# Patient Record
(demographics unavailable — no encounter records)

---

## 2024-10-29 NOTE — ASSESSMENT
[FreeTextEntry1] : Lower lumbar level myelomeningocele Status post reconstruction of right calcaneal cable valgus foot deformity  Patient will resume all activities.  He will return in 6 months for reevaluation.

## 2024-10-29 NOTE — DATA REVIEWED
[de-identified] : X-ray evaluation of the right foot on 10/29/2024 (AP, lateral and oblique views) reveals satisfactory position of the osteotomies and the internal fixation devices.

## 2024-10-29 NOTE — PHYSICAL EXAM
[FreeTextEntry1] : On physical examination the patient's gait is quite good.  The foot is plantigrade.  He does have a flexion deformity of the second toe which may need reconstruction in the future.

## 2024-10-29 NOTE — CONSULT LETTER
[Dear  ___] : Dear  [unfilled], [Consult Letter:] : I had the pleasure of evaluating your patient, [unfilled]. [Please see my note below.] : Please see my note below. [Consult Closing:] : Thank you very much for allowing me to participate in the care of this patient.  If you have any questions, please do not hesitate to contact me. [Sincerely,] : Sincerely, [FreeTextEntry3] : Dr Cadet

## 2024-10-29 NOTE — HISTORY OF PRESENT ILLNESS
[FreeTextEntry1] : This 12-year-old male with lower lumbar level myelomeningocele returns for reevaluation status post reconstruction of the a right calcaneal cable valgus foot deformity.  The patient has no pain in the foot is in excellent position.  The mother has pointed out the deformity of his second toe which is in hyperflexion but is not causing the patient any discomfort or functional problem with ambulation.